# Patient Record
Sex: FEMALE | Race: WHITE | URBAN - NONMETROPOLITAN AREA
[De-identification: names, ages, dates, MRNs, and addresses within clinical notes are randomized per-mention and may not be internally consistent; named-entity substitution may affect disease eponyms.]

---

## 2017-12-13 ENCOUNTER — APPOINTMENT (RX ONLY)
Dept: URBAN - NONMETROPOLITAN AREA CLINIC 1 | Facility: CLINIC | Age: 42
Setting detail: DERMATOLOGY
End: 2017-12-13

## 2017-12-13 DIAGNOSIS — D485 NEOPLASM OF UNCERTAIN BEHAVIOR OF SKIN: ICD-10-CM

## 2017-12-13 PROBLEM — L70.0 ACNE VULGARIS: Status: ACTIVE | Noted: 2017-12-13

## 2017-12-13 PROBLEM — L55.1 SUNBURN OF SECOND DEGREE: Status: ACTIVE | Noted: 2017-12-13

## 2017-12-13 PROBLEM — D48.5 NEOPLASM OF UNCERTAIN BEHAVIOR OF SKIN: Status: ACTIVE | Noted: 2017-12-13

## 2017-12-13 PROCEDURE — ? OBSERVATION

## 2017-12-13 PROCEDURE — 99201: CPT

## 2017-12-13 ASSESSMENT — LOCATION DETAILED DESCRIPTION DERM: LOCATION DETAILED: RIGHT CENTRAL MALAR CHEEK

## 2017-12-13 ASSESSMENT — LOCATION ZONE DERM: LOCATION ZONE: FACE

## 2017-12-13 ASSESSMENT — LOCATION SIMPLE DESCRIPTION DERM: LOCATION SIMPLE: RIGHT CHEEK

## 2017-12-13 NOTE — PROCEDURE: OBSERVATION
X Size Of Lesion In Cm (Optional): 0.2
Body Location Override (Optional - Billing Will Still Be Based On Selected Body Map Location If Applicable): R Cheek
Size Of Lesion In Cm (Optional): 0.4
Detail Level: Simple

## 2018-01-24 ENCOUNTER — APPOINTMENT (RX ONLY)
Dept: URBAN - NONMETROPOLITAN AREA CLINIC 1 | Facility: CLINIC | Age: 43
Setting detail: DERMATOLOGY
End: 2018-01-24

## 2018-01-24 DIAGNOSIS — L72.0 EPIDERMAL CYST: ICD-10-CM

## 2018-01-24 DIAGNOSIS — L81.4 OTHER MELANIN HYPERPIGMENTATION: ICD-10-CM

## 2018-01-24 DIAGNOSIS — D18.0 HEMANGIOMA: ICD-10-CM

## 2018-01-24 PROBLEM — D18.01 HEMANGIOMA OF SKIN AND SUBCUTANEOUS TISSUE: Status: ACTIVE | Noted: 2018-01-24

## 2018-01-24 PROCEDURE — ? PRESCRIPTION

## 2018-01-24 PROCEDURE — 99213 OFFICE O/P EST LOW 20 MIN: CPT

## 2018-01-24 PROCEDURE — ? COUNSELING

## 2018-01-24 RX ORDER — TRETINOIN 0.25 MG/G
CREAM TOPICAL
Qty: 1 | Refills: 3 | Status: ERX | COMMUNITY
Start: 2018-01-24

## 2018-01-24 RX ADMIN — TRETINOIN 1: 0.25 CREAM TOPICAL at 00:00

## 2018-01-24 ASSESSMENT — LOCATION SIMPLE DESCRIPTION DERM
LOCATION SIMPLE: ABDOMEN
LOCATION SIMPLE: RIGHT CHEEK
LOCATION SIMPLE: LEFT FOREARM
LOCATION SIMPLE: RIGHT FOREARM
LOCATION SIMPLE: LEFT CHEEK

## 2018-01-24 ASSESSMENT — LOCATION DETAILED DESCRIPTION DERM
LOCATION DETAILED: RIGHT LATERAL MALAR CHEEK
LOCATION DETAILED: PERIUMBILICAL SKIN
LOCATION DETAILED: RIGHT PROXIMAL DORSAL FOREARM
LOCATION DETAILED: LEFT INFERIOR CENTRAL MALAR CHEEK
LOCATION DETAILED: RIGHT SUPERIOR CENTRAL MALAR CHEEK
LOCATION DETAILED: LEFT DISTAL DORSAL FOREARM

## 2018-01-24 ASSESSMENT — LOCATION ZONE DERM
LOCATION ZONE: FACE
LOCATION ZONE: TRUNK
LOCATION ZONE: ARM

## 2019-01-18 ENCOUNTER — APPOINTMENT (RX ONLY)
Dept: URBAN - NONMETROPOLITAN AREA CLINIC 1 | Facility: CLINIC | Age: 44
Setting detail: DERMATOLOGY
End: 2019-01-18

## 2019-01-18 DIAGNOSIS — Z41.9 ENCOUNTER FOR PROCEDURE FOR PURPOSES OTHER THAN REMEDYING HEALTH STATE, UNSPECIFIED: ICD-10-CM

## 2019-01-18 DIAGNOSIS — L91.8 OTHER HYPERTROPHIC DISORDERS OF THE SKIN: ICD-10-CM

## 2019-01-18 DIAGNOSIS — D18.0 HEMANGIOMA: ICD-10-CM

## 2019-01-18 DIAGNOSIS — L72.0 EPIDERMAL CYST: ICD-10-CM

## 2019-01-18 PROBLEM — D18.01 HEMANGIOMA OF SKIN AND SUBCUTANEOUS TISSUE: Status: ACTIVE | Noted: 2019-01-18

## 2019-01-18 PROCEDURE — ? BOTOX

## 2019-01-18 PROCEDURE — ? SKIN TAG REMOVAL (COSMETIC)

## 2019-01-18 PROCEDURE — ? COSMETIC CONSULTATION: LASER RESURFACING

## 2019-01-18 PROCEDURE — ? ADDITIONAL NOTES

## 2019-01-18 PROCEDURE — ? COUNSELING

## 2019-01-18 PROCEDURE — ? BENIGN DESTRUCTION COSMETIC

## 2019-01-18 PROCEDURE — 99213 OFFICE O/P EST LOW 20 MIN: CPT

## 2019-01-18 PROCEDURE — ? PRESCRIPTION

## 2019-01-18 PROCEDURE — ? COSMETIC CONSULTATION: BOTULINUM TOXIN

## 2019-01-18 PROCEDURE — ? COSMETIC CONSULTATION: FILLERS

## 2019-01-18 RX ORDER — VALACYCLOVIR 1 G/1
TABLET ORAL
Qty: 30 | Refills: 3 | Status: ERX | COMMUNITY
Start: 2019-01-18

## 2019-01-18 RX ADMIN — VALACYCLOVIR 1: 1 TABLET ORAL at 00:00

## 2019-01-18 ASSESSMENT — LOCATION DETAILED DESCRIPTION DERM
LOCATION DETAILED: INFERIOR THORACIC SPINE
LOCATION DETAILED: LEFT SUPERIOR VERMILION BORDER
LOCATION DETAILED: RIGHT SUPERIOR VERMILION LIP
LOCATION DETAILED: RIGHT CENTRAL MALAR CHEEK
LOCATION DETAILED: EPIGASTRIC SKIN
LOCATION DETAILED: RIGHT PERIAREOLAR BREAST 6-7:00 REGION
LOCATION DETAILED: RIGHT SUPERIOR CENTRAL MALAR CHEEK

## 2019-01-18 ASSESSMENT — LOCATION SIMPLE DESCRIPTION DERM
LOCATION SIMPLE: LEFT UPPER LIP
LOCATION SIMPLE: RIGHT BREAST
LOCATION SIMPLE: UPPER BACK
LOCATION SIMPLE: RIGHT LIP
LOCATION SIMPLE: ABDOMEN
LOCATION SIMPLE: RIGHT CHEEK

## 2019-01-18 ASSESSMENT — LOCATION ZONE DERM
LOCATION ZONE: FACE
LOCATION ZONE: LIP
LOCATION ZONE: TRUNK

## 2019-01-18 NOTE — PROCEDURE: ADDITIONAL NOTES
Detail Level: Simple
Additional Notes: I counseled the patient regarding the following:\\n\\nRN discussed the risks and benefits of botox including but not limited to bruising, muscle weakness, lid or brow ptosis, double vision, facial weakness, headaches, procedural pain, temporary benefit only.\\n\\nIt is impossible to know the exact location of the vasculature in the treatment area. If a vein or artery is compromised by the injection, pt may experience an increase in bruising as a result. This is temporary and usually resolves in a few days to a few weeks. \\n\\nPt understands botox is a neuro-modulator that is injected into the muscle, which relaxes the muscle movement. \\n\\nPatient understands that treatment with botox only lessens dynamic wrinkles on a temporary basis, usually for 2-3 months. \\n\\nPt is aware of the variability in patient response, including a lack of response to treatment, and that no guarantee of length of benefit can be made. \\n\\nPt taking blood thinners prior to treatment may experience an increased risk for bruising and bleeding at the injection site. \\n\\nPatient should not lie down for 4 hours after injections nor exercise for 24 hours. \\n\\nIf bruising presents, pt may utilize oral or topical arnica and ice for treatment. \\n\\nPt instructed to contact the clinic with any questions, concerns, or post treatment complications; ie: pt does not feel the product worked for them, etc.\\n\\nPatient understands that the treatment is cosmetic in nature and not covered by insurance.\\n\\Herbert pt concerns and questions addressed and pt verbalized understanding.

## 2019-01-18 NOTE — PROCEDURE: SKIN TAG REMOVAL (COSMETIC)
Consent: Written consent obtained and the risks of skin tag removal was reviewed with the patient including but not limited to bleeding, pigmentary change, infection, pain, and remote possibility of scarring.
Anesthesia Type: 1% lidocaine with 1:100,000 epinephrine and a 1:10 solution of 8.4% sodium bicarbonate
Removed With: scissors
Price (Use Numbers Only, No Special Characters Or $): 25
Detail Level: Detailed
Anesthesia Volume In Cc: 1

## 2019-01-18 NOTE — PROCEDURE: BOTOX
Additional Area 4 Units: 0
Expiration Date (Month Year): 06/20
Consent: Written consent reviewed by RN and signed by pt. Risks include but not limited to lid/brow ptosis, bruising, swelling, diplopia, temporary effect, incomplete chemical denervation.  \\nPt denies pregnancy. \\nIf pt is breastfeeding, they are counseled to dispose of breast milk for 24 hours post botox injection. \\nPt denies current administration of anti-biotics. \\nPt denies allergy to albumin or lactose. \\nPt's taking blood thinners are counseled on the increased risk of bleeding and bruising at the injection site.
Price (Use Numbers Only, No Special Characters Or $): 018
Levator Labii Superioris Units: 8
Forehead Units: 10
Detail Level: Detailed
Reconstitution Date (Optional): 1/18/19
Dilution (U/0.1 Cc): 4
Lot #: [id number]
Post-Care Instructions: Patient instructed to not lie down for 4 hours and limit physical activity for 24 hours. RN recommends topical arnica and/or ice if bruising presents.\\n\\nPt instructed to contact the clinic with any questions, concerns, or post treatment complications, ie: the pt feels the product did not work for them, etc.  \\n\\Herbert pt concerns and questions addressed and pt verbalized understanding.

## 2019-01-18 NOTE — PROCEDURE: BENIGN DESTRUCTION COSMETIC
Post-Care Instructions: Patient instructed to wash treated area(s) with mild soap and water.  Patient is to wear sun protection, and avoid picking at the treated lesion.
Consent: Discussed with patient risks of crusting, scabbing, blistering, scarring, darker or lighter pigmentary change, recurrence, incomplete removal and infection.
Detail Level: Simple
Anesthesia Volume In Cc: 0.5
Price (Use Numbers Only, No Special Characters Or $): 0

## 2019-01-24 ENCOUNTER — APPOINTMENT (RX ONLY)
Dept: URBAN - NONMETROPOLITAN AREA CLINIC 1 | Facility: CLINIC | Age: 44
Setting detail: DERMATOLOGY
End: 2019-01-24

## 2019-01-24 DIAGNOSIS — Z41.9 ENCOUNTER FOR PROCEDURE FOR PURPOSES OTHER THAN REMEDYING HEALTH STATE, UNSPECIFIED: ICD-10-CM

## 2019-01-24 PROCEDURE — ? ADDITIONAL NOTES

## 2019-01-24 PROCEDURE — ? BOTOX

## 2019-01-24 PROCEDURE — ? CLEAR AND BRILLIANT LASER

## 2019-01-24 ASSESSMENT — LOCATION ZONE DERM: LOCATION ZONE: FACE

## 2019-01-24 ASSESSMENT — LOCATION SIMPLE DESCRIPTION DERM: LOCATION SIMPLE: INFERIOR FOREHEAD

## 2019-01-24 ASSESSMENT — LOCATION DETAILED DESCRIPTION DERM: LOCATION DETAILED: INFERIOR MID FOREHEAD

## 2019-01-24 NOTE — PROCEDURE: ADDITIONAL NOTES
Additional Notes: [facility] and [facility] Laser Patient Post Care Instructions\\n\\nWhat to Expect After Treatment \\nLaser Treatments produce side effects. The intensity and duration of your side effects depends on the treatment aggressiveness and your individual healing characteristics. Generally, patients who are treated more aggressively experience more intense and longer lasting side effects; however, some patients who receive a less aggressive treatment may experience side effects of greater-than-expected magnitude, while others receiving more aggressive treatments may experience side effects of less-than-expected magnitude. Notify your provider if the severity of your side effects becomes a problem for you. \\n\\nWhat you may feel and how you will look: \\n\\n•	Immediately after the treatment, you will experience redness; similar to a sunburn.  Most redness resolves during the first day after treatment, but a jordan “glow” can remain for several days. If you wish, you can apply makeup to minimize the redness 3 hours post treatment. \\n\\n•	Swelling is possible post treatment but usually resolves by the next day.  You may use ice to calm the swelling if you wish.   \\n\\n•	Heat sensation can be intense for the following 2-3 hours.  You may use ice to calm the sensation. \\n \\n•	Over the next few days, the redness will subside.  Your skin may feel dry, peel, or flake. You may notice a “sandpaper” texture a few days after treatment. This is the treated tissue working its way out of your body as new fresh skin is regenerated. \\n\\n•	Most patients have an improved appearance 3 to 4 days post treatment.  Treatments can be performed monthly. \\n\\n•	If you experience cold sores we will provide you with an anti-viral medication prior to treatment.  You may pre treat for 3 days before the treatment if you regularly experience cold sore breakouts.  If you do not regularly experience cold sores, we will still provide you with the prescription for the anti-viral medication, which you could take if you started to experience symptoms (tingling, itching, small sores around mouth) per the prescribed instructions. \\n\\n\\n•	Sun protection is imperative following the treatment.  It is recommended that direct sun is avoided for 48 to 96 hours post treatment.  If sun exposure is unavoidable, the patient must be diligent about reapplication of a broad spectrum sunscreen every 90 minutes, with a hat or clothing used to protect the treatment area. \\n\\n\\n \\nHow to Care for Your Skin After Treatment\\n\\nCongratulations! You have taken the first step toward more healthy and radiant looking skin by having a Clear and Tur[facility] laser treatment. Now it is important to help your skin heal quickly and protect your skin investment. \\nYour after treatment skin care regimen is tailored to the treatment you received today. General guidelines are listed below and may be customized to you:  \\nImmediately After Treatment \\n•	You may cleanse your face with a mild cleanser. \\n•	Cold Sores - If you have a history of cold sores, advise your provider so a prescription can be sent in to your pharmacy. \\n•	Keep area moisturized continuously with a gentle moisturizer, preferably nothing occlusive, ie: Aquaphor. \\n•	Use of icepacks helps alleviate the heat sensation and swelling if it presents.  \\n•	Abnormal Healing - If you notice any blisters, cuts, bruises, crusting/scabs, areas of raw skin, ulcerations, active bleeding, increased discomfort or pain, pigment changes (lighter or darker than usual complexion), or any other problems, please contact us as soon as possible. Do not pick at the blisters or ulcerations, apply a topical antibiotic cream and contact the office.  These usually resolve without permanent damage. \\nFirst One to Two Days of the Healing Process \\n•	Continue cleansing and moisturizing with the recommended products to prevent clogged pores and breakouts. \\n\\n•	During the first day of the healing process avoid smoking, excessive alcohol consumption, excessive exercise, perspiring, swimming, or exposing skin to heat and sun. \\n\\n•	Remember that peeling and/or flaking is normal during the healing process but not experienced by all patients. The most common reaction is a sandpaper feeling for 2 to 4 days post treatment, however this may not be visible to others. \\n\\n•	Avoid scrubs, toners, glycolic acids, and Retin A until the skin has healed.  \\n\\n•	It is very important that you use sunscreen to prevent sun damage to the skin. Sunscreen should offer broadband protection (UVA and UVB) and have a sun protection factor (SPF) of 30 or more. Use sunscreen daily after your last treatment. Apply sunscreen 20 minutes before going outside, and again, immediately before. Reapply sunscreen every 90 minutes. If direct sun exposure is necessary, wear a hat and clothing that covers the treated area. Your practice of diligent sunscreen use may lower the risk of laser-induced hyperpigmentation (darker color).\\n\\n•	Once the skin is healed you may resume your normal skin care regimen. If you have questions about skin care products please speak with your provider. \\n\\n•	If you have questions or concerns, please contact our office at [phone].  If it is after hours or on the weekend the practitioner may provide you with a way to contact them.  \\n\\n•	Other instructions:\\n
Detail Level: Simple

## 2019-01-24 NOTE — PROCEDURE: CLEAR AND BRILLIANT LASER
Detail Level: Zone
Post-Care Instructions: RN reviewed with the patient in detail post-care instructions.  Pt aware of post treatment pain, swelling, redness, and rough texture, which can last days.  Strict sun avoidance and protection emphasized.  If sun exposure is unavoidable, pt instructed to cover tx area with clothing. \\n\\Herbert pt concerns and questions addressed, pt verbalized understanding.\\n\\n
Post-Procedure Care: 4 passes with C&B handpiece, medium level, additional pass over areas of concern. Moisturizer and sun protection applied to treatment area.
Pre-Procedure Text: Pre treatment photos taken and topical numbing removed with a warm towel prior to procedure.\\n\\nPt states they have been taking valacyclovir, as instructed, 3 days prior to and post treatment for suppression of cold sores.
Consent: Written consent reviewed by RN and signed by pt.  Risks reviewed including but not limited to redness, heat sensation, swelling, pigmentary changes, scabbing, crusting, blistering, and reactivation of cold sores. \\n\\nPt aware that several treatments may be needed to achieve desired results. \\n\\nNo guarantees can be made and results vary from pt to pt. \\n\\n
Laser Type: Clear+Brilliant
Price (Use Numbers Only, No Special Characters Or $): 509
Energy Level: Medium

## 2019-02-07 ENCOUNTER — APPOINTMENT (RX ONLY)
Dept: URBAN - NONMETROPOLITAN AREA CLINIC 1 | Facility: CLINIC | Age: 44
Setting detail: DERMATOLOGY
End: 2019-02-07

## 2019-02-07 DIAGNOSIS — Z41.9 ENCOUNTER FOR PROCEDURE FOR PURPOSES OTHER THAN REMEDYING HEALTH STATE, UNSPECIFIED: ICD-10-CM

## 2019-02-07 PROCEDURE — ? COSMETIC FOLLOW-UP

## 2019-02-07 NOTE — PROCEDURE: COSMETIC FOLLOW-UP
Detail Level: Zone
Side Effects Or Complications: Swelling
Treatment (Optional): Laser Resurfacing
Price (Use Numbers Only, No Special Characters Or $): 0
Patient Satisfaction: Very pleased
Global Improvement: Very Good
Comments (Free Text): Compared pt photos pre and post C&B and Botox of FH, GLB, & Odalis-oral.  Pt is very please with let result, noticed an improvement in fine lines and pigmentation.  Pt very pleased with FH and GLB Botox but states she still sees lines around her mouth and would not have Botox injected in that area again.  RN reassured pt the Botox can prevent worsening of the lines and recommended microneedling for spot tx of perioral area.  \\n\\nPt provided a sample of Elta Eye Gel to diminish under eye puffiness and dark circles.  \\n\\nPt’s father currently in the hospital in [place] with EtOH related cardiac issues.

## 2019-02-21 ENCOUNTER — APPOINTMENT (RX ONLY)
Dept: URBAN - NONMETROPOLITAN AREA CLINIC 1 | Facility: CLINIC | Age: 44
Setting detail: DERMATOLOGY
End: 2019-02-21

## 2019-02-21 DIAGNOSIS — Z41.9 ENCOUNTER FOR PROCEDURE FOR PURPOSES OTHER THAN REMEDYING HEALTH STATE, UNSPECIFIED: ICD-10-CM

## 2019-02-21 PROCEDURE — ? ADDITIONAL NOTES

## 2019-02-21 PROCEDURE — ? CLEAR AND BRILLIANT LASER

## 2019-02-21 ASSESSMENT — LOCATION ZONE DERM: LOCATION ZONE: FACE

## 2019-02-21 ASSESSMENT — LOCATION SIMPLE DESCRIPTION DERM: LOCATION SIMPLE: RIGHT CHEEK

## 2019-02-21 ASSESSMENT — LOCATION DETAILED DESCRIPTION DERM: LOCATION DETAILED: RIGHT SUPERIOR CENTRAL MALAR CHEEK

## 2019-02-21 NOTE — PROCEDURE: CLEAR AND BRILLIANT LASER
Price (Use Numbers Only, No Special Characters Or $): 612
Consent: Written consent reviewed by RN and signed by pt.  Risks reviewed including but not limited to redness, heat sensation, swelling, pigmentary changes, scabbing, crusting, blistering, and reactivation of cold sores. \\n\\nPt aware that several treatments may be needed to achieve desired results. \\n\\nNo guarantees can be made and results vary from pt to pt.
Laser Type: Clear and Brilliant Permea 1927nm
Pre-Procedure Text: Pre treatment photos taken and topical numbing removed with a warm towel prior to procedure.\\n\\nPt states she has not taken been taking valacyclovir, as instructed, 3 days prior to and post treatment for suppression of cold sores.  Pt instructed to follow Rx’d instructions for outbreak if s/six of a cold sore presents.
Post-Procedure Care: 4 passes with premea handpiece, medium level, additional pass over areas of concern. Moisturizer and sun protection applied to treatment area.
Detail Level: Zone
Post-Care Instructions: RN reviewed with the patient in detail post-care instructions.  Pt aware of post treatment pain, swelling, redness, and rough texture, which can last days.  Strict sun avoidance and protection emphasized.  If sun exposure is unavoidable, pt instructed to cover tx area with clothing. \\n\\Herbert pt concerns and questions addressed, pt verbalized understanding.
Energy Level: High

## 2019-02-21 NOTE — PROCEDURE: ADDITIONAL NOTES
Additional Notes: [facility] Patient Post Care Instructions\\n\\nWhat to Expect After Treatment \\nLaser Treatments produce side effects. The intensity and duration of your side effects depends on the treatment aggressiveness and your individual healing characteristics. Generally, patients who are treated more aggressively experience more intense and longer lasting side effects; however, some patients who receive a less aggressive treatment may experience side effects of greater-than-expected magnitude, while others receiving more aggressive treatments may experience side effects of less-than-expected magnitude. Notify your provider if the severity of your side effects becomes a problem for you. \\n\\nWhat you may feel and how you will look: \\n\\n•	Immediately after the treatment, you will experience redness; similar to a sunburn.  Most redness resolves during the first day after treatment, but a jordan “glow” can remain for several days. If you wish, you can apply makeup to minimize the redness 3 hours post treatment. \\n\\n•	Swelling is possible post treatment but usually resolves by the next day.  You may use ice to calm the swelling if you wish.   \\n\\n•	Heat sensation can be intense for the following 2-3 hours.  You may use ice to calm the sensation. \\n \\n•	Over the next few days, the redness will subside.  Your skin may feel dry, peel, or flake. You may notice a “sandpaper” texture a few days after treatment. This is the treated tissue working its way out of your body as new fresh skin is regenerated. \\n\\n•	Most patients have an improved appearance 3 to 4 days post treatment.  Treatments can be performed monthly. \\n\\n•	If you experience cold sores we will provide you with an anti-viral medication prior to treatment.  You may pre treat for 3 days before the treatment if you regularly experience cold sore breakouts.  If you do not regularly experience cold sores, we will still provide you with the prescription for the anti-viral medication, which you could take if you started to experience symptoms (tingling, itching, small sores around mouth) per the prescribed instructions. \\n\\n\\n•	Sun protection is imperative following the treatment.  It is recommended that direct sun is avoided for 48 to 96 hours post treatment.  If sun exposure is unavoidable, the patient must be diligent about reapplication of a broad spectrum sunscreen every 90 minutes, with a hat or clothing used to protect the treatment area. \\n\\n\\n \\nHow to Care for Your Skin After Treatment\\n\\nCongratulations! You have taken the first step toward more healthy and radiant looking skin by having a Clear and [facility] laser treatment. Now it is important to help your skin heal quickly and protect your skin investment. \\nYour after treatment skin care regimen is tailored to the treatment you received today. General guidelines are listed below and may be customized to you:  \\nImmediately After Treatment \\n•	You may cleanse your face with a mild cleanser. \\n•	Cold Sores - If you have a history of cold sores, advise your provider so a prescription can be sent in to your pharmacy. \\n•	Keep area moisturized continuously with a gentle moisturizer, preferably nothing occlusive, ie: Aquaphor. \\n•	Use of icepacks helps alleviate the heat sensation and swelling if it presents.  \\n•	Abnormal Healing - If you notice any blisters, cuts, bruises, crusting/scabs, areas of raw skin, ulcerations, active bleeding, increased discomfort or pain, pigment changes (lighter or darker than usual complexion), or any other problems, please contact us as soon as possible. Do not pick at the blisters or ulcerations, apply a topical antibiotic cream and contact the office.  These usually resolve without permanent damage. \\nFirst One to Two Days of the Healing Process \\n•	Continue cleansing and moisturizing with the recommended products to prevent clogged pores and breakouts. \\n\\n•	During the first day of the healing process avoid smoking, excessive alcohol consumption, excessive exercise, perspiring, swimming, or exposing skin to heat and sun. \\n\\n•	Remember that peeling and/or flaking is normal during the healing process but not experienced by all patients. The most common reaction is a sandpaper feeling for 2 to 4 days post treatment, however this may not be visible to others. \\n\\n•	Avoid scrubs, toners, glycolic acids, and Retin A until the skin has healed.  \\n\\n•	It is very important that you use sunscreen to prevent sun damage to the skin. Sunscreen should offer broadband protection (UVA and UVB) and have a sun protection factor (SPF) of 30 or more. Use sunscreen daily after your last treatment. Apply sunscreen 20 minutes before going outside, and again, immediately before. Reapply sunscreen every 90 minutes. If direct sun exposure is necessary, wear a hat and clothing that covers the treated area. Your practice of diligent sunscreen use may lower the risk of laser-induced hyperpigmentation (darker color).\\n\\n•	Once the skin is healed you may resume your normal skin care regimen. If you have questions about skin care products please speak with your provider. \\n\\n•	If you have questions or concerns, please contact our office at [phone].  If it is after hours or on the weekend the practitioner may provide you with a way to contact them.  \\n\\n•	Other instructions:\\n
Detail Level: Simple

## 2019-03-07 ENCOUNTER — APPOINTMENT (RX ONLY)
Dept: URBAN - NONMETROPOLITAN AREA CLINIC 1 | Facility: CLINIC | Age: 44
Setting detail: DERMATOLOGY
End: 2019-03-07

## 2019-03-07 DIAGNOSIS — Z41.9 ENCOUNTER FOR PROCEDURE FOR PURPOSES OTHER THAN REMEDYING HEALTH STATE, UNSPECIFIED: ICD-10-CM

## 2019-03-07 PROCEDURE — ? COSMETIC FOLLOW-UP

## 2019-03-07 NOTE — PROCEDURE: COSMETIC FOLLOW-UP
Improvement Override (Free Text): compared pt before and after photos and saw definite lightening of the pigmented areas.
Comments (Free Text): Pt states she exfoliated with a mechanical scrub 4 days post tx to remove increased pigmentation. Pt educated to follow post care instructions and do not exfoliate until fully healed from the procedure. Pt verbalized understanding.
Price (Use Numbers Only, No Special Characters Or $): 0
Global Improvement: Modest
Patient Satisfaction: Pleased
Treatment (Optional): Laser
Side Effects Override (Free Text): pigment destruction with visible increased pigmentation while healing and diminished pigment 2 weeks post tx.
Detail Level: Zone

## 2019-03-28 ENCOUNTER — APPOINTMENT (RX ONLY)
Dept: URBAN - NONMETROPOLITAN AREA CLINIC 1 | Facility: CLINIC | Age: 44
Setting detail: DERMATOLOGY
End: 2019-03-28

## 2019-03-28 DIAGNOSIS — Z41.9 ENCOUNTER FOR PROCEDURE FOR PURPOSES OTHER THAN REMEDYING HEALTH STATE, UNSPECIFIED: ICD-10-CM

## 2019-03-28 PROCEDURE — ? ADDITIONAL NOTES

## 2019-03-28 PROCEDURE — ? COSMETIC FOLLOW-UP

## 2019-03-28 PROCEDURE — ? CLEAR AND BRILLIANT LASER

## 2019-03-28 ASSESSMENT — LOCATION SIMPLE DESCRIPTION DERM: LOCATION SIMPLE: INFERIOR FOREHEAD

## 2019-03-28 ASSESSMENT — LOCATION ZONE DERM: LOCATION ZONE: FACE

## 2019-03-28 ASSESSMENT — LOCATION DETAILED DESCRIPTION DERM: LOCATION DETAILED: INFERIOR MID FOREHEAD

## 2019-03-28 NOTE — PROCEDURE: CLEAR AND BRILLIANT LASER
Detail Level: Zone
Energy Level: High
Consent: Written consent reviewed by RN and signed by pt.  Risks reviewed including but not limited to redness, heat sensation, swelling, pigmentary changes, scabbing, crusting, blistering, and reactivation of cold sores. \\n\\nPt aware that several treatments may be needed to achieve desired results. \\n\\nNo guarantees can be made and results vary from pt to pt.
Laser Type: Clear and Brilliant Permea 1927nm
Topical Anesthesia?: 23% lidocaine, 7% tetracaine
Post-Care Instructions: RN reviewed with the patient in detail post-care instructions.  Pt aware of post treatment pain, swelling, redness, and rough texture, which can last days.  Strict sun avoidance and protection emphasized.  If sun exposure is unavoidable, pt instructed to cover tx area with clothing. \\n\\Herbert pt concerns and questions addressed, pt verbalized understanding.
Post-Procedure Care: 8 passes with C&B handpiece, high level, additional pass over areas of concern, and 2 passes, low level under the eyes. Moisturizer and sun protection applied to treatment area.
Length Of Topical Anesthesia Application (Optional): 15 minutes
Price (Use Numbers Only, No Special Characters Or $): 690
Pre-Procedure Text: Pre treatment photos taken and topical numbing removed with a warm towel prior to procedure.\\n\\nPt states she will begin taking oral anti-viral pst tax today and for the next 3 days.

## 2019-03-28 NOTE — PROCEDURE: COSMETIC FOLLOW-UP
Treatment (Optional): Laser Resurfacing
Detail Level: Zone
Side Effects Or Complications: Over correction
Comments (Free Text): RN compared before and after photos with pt.  Pt states she can see a very good improvement in her melasma and she is very pleased with her result.
Treatment Override (Free Text): C&B Premea
Global Improvement: Very Good
Price (Use Numbers Only, No Special Characters Or $): 0
Patient Satisfaction: Very pleased

## 2019-04-25 ENCOUNTER — APPOINTMENT (RX ONLY)
Dept: URBAN - NONMETROPOLITAN AREA CLINIC 1 | Facility: CLINIC | Age: 44
Setting detail: DERMATOLOGY
End: 2019-04-25

## 2019-04-25 DIAGNOSIS — Z41.9 ENCOUNTER FOR PROCEDURE FOR PURPOSES OTHER THAN REMEDYING HEALTH STATE, UNSPECIFIED: ICD-10-CM

## 2019-04-25 PROCEDURE — ? BOTOX

## 2019-04-25 PROCEDURE — ? COSMETIC FOLLOW-UP

## 2019-04-25 NOTE — PROCEDURE: BOTOX
Additional Area 4 Units: 0
Consent: Written consent reviewed by RN and signed by pt. Risks include but not limited to lid/brow ptosis, bruising, swelling, diplopia, temporary effect, incomplete chemical denervation.\\nPt denies pregnancy.   \\nIf pt is breastfeeding, they are counseled to dispose of breast milk for 24 hours post botox injection. \\nPt denies current administration of anti-biotics. \\nPt denies allergy to albumin or lactose. \\nPt's taking blood thinners are counseled on the increased risk of bleeding and bruising at the injection site.
Detail Level: Detailed
Forehead Units: 10
Lot #: [id number]
Expiration Date (Month Year): 6/30/2020
Post-Care Instructions: Pt has a hx of cold sores, instructed to take an antiviral prior to and post tx for suppression of an outbreak. \\n\\nPatient instructed to not lie down for 4 hours and limit physical activity for 24 hours. RN recommends topical arnica and/or ice if bruising presents.\\n\\nPt instructed to contact the clinic with any questions, concerns, or post treatment complications, ie: the pt feels the product did not work for them, etc.  \\n\\Herbert pt concerns and questions addressed and pt verbalized understanding.
Dilution (U/0.1 Cc): 4
Price (Use Numbers Only, No Special Characters Or $): 240
Reconstitution Date (Optional): 4/25/19

## 2019-04-25 NOTE — PROCEDURE: COSMETIC FOLLOW-UP
Patient Satisfaction: Pleased
Price (Use Numbers Only, No Special Characters Or $): 0
Treatment Override (Free Text): C&B
Side Effects Override (Free Text): pt states she had darkening of melisma pigment then clearing that took as much as 2 weeks. pt states she is pleased and will be booking again soon
Global Improvement: Modest
Detail Level: Zone

## 2019-05-10 ENCOUNTER — APPOINTMENT (RX ONLY)
Dept: URBAN - NONMETROPOLITAN AREA CLINIC 1 | Facility: CLINIC | Age: 44
Setting detail: DERMATOLOGY
End: 2019-05-10

## 2019-09-27 ENCOUNTER — APPOINTMENT (RX ONLY)
Dept: URBAN - NONMETROPOLITAN AREA CLINIC 1 | Facility: CLINIC | Age: 44
Setting detail: DERMATOLOGY
End: 2019-09-27

## 2019-09-27 DIAGNOSIS — Z41.9 ENCOUNTER FOR PROCEDURE FOR PURPOSES OTHER THAN REMEDYING HEALTH STATE, UNSPECIFIED: ICD-10-CM

## 2019-09-27 PROCEDURE — ? BOTOX

## 2019-09-27 PROCEDURE — ? ADDITIONAL NOTES

## 2019-09-27 PROCEDURE — ? FILLERS

## 2019-09-27 NOTE — PROCEDURE: FILLERS
Dorsal Hands Filler Volume In Cc: 0
Consent: Written consent reviewed by RN and signed by pt. \\nRisks include but not limited to bruising, bleeding, irregular texture, ulceration, infection, allergic reaction, scar formation, incomplete augmentation, temporary nature, procedural pain, and vessel occlusion.  \\n\\Herbert pt concerns and questions addressed, pt verbalized understanding.
Include Cannula Information In Note?: No
Detail Level: Detailed
Map Statment: See Attach Map for Complete Details
Expiration Date (Month Year): 10/31/19
Topical Anesthesia?: 23% lidocaine, 7% tetracaine
Post-Care Instructions: Patient instructed to apply ice to reduce swelling. RN also recommends Arnica gel to reduce bruising. Pt instructed to contact the provider if complications arise.
Filler: Juvederm Ultra
Lot #: [id number]
Price (Use Numbers Only, No Special Characters Or $): 630
Vermilion Lips Filler Volume In Cc: 1

## 2019-09-27 NOTE — PROCEDURE: BOTOX
Additional Area 2 Units: 0
Lot #: [id number]
Detail Level: Detailed
Price (Use Numbers Only, No Special Characters Or $): 312
Glabellar Complex Units: 10
Forehead Units: 16
Dilution (U/0.1 Cc): 4
Post-Care Instructions: Patient instructed to not lie down for 4 hours and limit physical activity for 24 hours. RN recommends topical arnica and/or ice if bruising presents.\\n\\nPt instructed to contact the clinic with any questions, concerns, or post treatment complications, ie: the pt feels the product did not work for them, etc.  \\n\\Herbert pt concerns and questions addressed and pt verbalized understanding.
Consent: Written consent reviewed by RN and signed by pt. Risks include but not limited to lid/brow ptosis, bruising, swelling, diplopia, temporary effect, incomplete chemical denervation.  \\nPt denies pregnancy. \\nIf pt is breastfeeding, they are counseled to dispose of breast milk for 24 hours post botox injection. \\nPt denies current administration of anti-biotics. \\nPt denies allergy to albumin or lactose. \\nPt's taking blood thinners are counseled on the increased risk of bleeding and bruising at the injection site.

## 2019-09-27 NOTE — PROCEDURE: ADDITIONAL NOTES
Additional Notes: I counseled the patient regarding the following:\\n\\nRN discussed the risks and benefits of botox including but not limited to bruising, muscle weakness, lid or brow ptosis, double vision, facial weakness, headaches, procedural pain, temporary benefit only.\\n\\nIt is impossible to know the exact location of the vasculature in the treatment area. If a vein or artery is compromised by the injection, pt may experience an increase in bruising as a result. This is temporary and usually resolves in a few days to a few weeks. \\n\\nPt understands botox is a neuro-modulator that is injected into the muscle, which relaxes the muscle movement. \\n\\nPatient understands that treatment with botox only lessens dynamic wrinkles on a temporary basis, usually for 2-3 months. \\n\\nPt is aware of the variability in patient response, including a lack of response to treatment, and that no guarantee of length of benefit can be made. \\n\\nPt taking blood thinners prior to treatment may experience an increased risk for bruising and bleeding at the injection site. \\n\\nPatient should not lie down for 4 hours after injections nor exercise for 24 hours. \\n\\nIf bruising presents, pt may utilize oral or topical arnica and ice for treatment. \\n\\nPt instructed to contact the clinic with any questions, concerns, or post treatment complications; ie: pt does not feel the product worked for them, etc.\\n\\nPatient understands that the treatment is cosmetic in nature and not covered by insurance.\\n\\Herbert pt concerns and questions addressed and pt verbalized understanding.
Detail Level: Simple

## 2019-10-11 ENCOUNTER — APPOINTMENT (RX ONLY)
Dept: URBAN - NONMETROPOLITAN AREA CLINIC 1 | Facility: CLINIC | Age: 44
Setting detail: DERMATOLOGY
End: 2019-10-11

## 2019-10-11 DIAGNOSIS — Z41.9 ENCOUNTER FOR PROCEDURE FOR PURPOSES OTHER THAN REMEDYING HEALTH STATE, UNSPECIFIED: ICD-10-CM

## 2019-10-11 PROCEDURE — ? ADDITIONAL NOTES

## 2019-10-11 PROCEDURE — ? COSMETIC FOLLOW-UP

## 2019-10-11 PROCEDURE — ? HYALURONIDASE INJECTION

## 2019-10-11 PROCEDURE — ? BOTOX

## 2019-10-11 ASSESSMENT — LOCATION DETAILED DESCRIPTION DERM
LOCATION DETAILED: RIGHT SUPERIOR VERMILION LIP
LOCATION DETAILED: LEFT SUPERIOR VERMILION LIP
LOCATION DETAILED: LEFT UPPER CUTANEOUS LIP
LOCATION DETAILED: RIGHT UPPER CUTANEOUS LIP

## 2019-10-11 ASSESSMENT — LOCATION SIMPLE DESCRIPTION DERM
LOCATION SIMPLE: LEFT LIP
LOCATION SIMPLE: RIGHT LIP

## 2019-10-11 ASSESSMENT — LOCATION ZONE DERM: LOCATION ZONE: LIP

## 2019-10-11 NOTE — PROCEDURE: HYALURONIDASE INJECTION
Detail Level: Detailed
Hyaluronidase Preparation: hyaluronidase
Filler Previously Used (Optional): Ultra
Total Volume (Ccs): 1
Lot # (Optional): [id number]
Administered By (Optional): VALORIE Rodríguez
Consent: Hyaluronidase mixed with 0.1 ml of 1% lidocaine. \\n\\nThe risks of contour defects and dimpling of the skin were reviewed with the patient prior to the injection.

## 2019-10-11 NOTE — PROCEDURE: COSMETIC FOLLOW-UP
Treatment (Optional): Filler Injection
Price (Use Numbers Only, No Special Characters Or $): 0
Patient Satisfaction: Dissatisfied
Global Improvement: Very Good
Detail Level: Zone
Comments (Free Text): Pt  was displeased with L upper lip where multiple lines were present. We decided with the help of Dr. De Los Santos to take the  filler out and replace it with a syringe of complimentary filler provided by [employer] for training purposes.
Side Effects Override (Free Text): bruising
no

## 2019-10-11 NOTE — PROCEDURE: BOTOX
Detail Level: Detailed
Additional Area 4 Units: 0
Forehead Units: 8
Dilution (U/0.1 Cc): 4
Consent: Written consent reviewed by RN and signed by pt. Risks include but not limited to lid/brow ptosis, bruising, swelling, diplopia, temporary effect, incomplete chemical denervation.  \\nPt denies pregnancy. \\nIf pt is breastfeeding, they are counseled to dispose of breast milk for 24 hours post botox injection. \\nPt denies current administration of anti-biotics. \\nPt denies allergy to albumin or lactose. \\nPt's taking blood thinners are counseled on the increased risk of bleeding and bruising at the injection site.
Post-Care Instructions: Patient instructed to not lie down for 4 hours and limit physical activity for 24 hours. RN recommends topical arnica and/or ice if bruising presents.\\n\\nPt instructed to contact the clinic with any questions, concerns, or post treatment complications, ie: the pt feels the product did not work for them, etc.  \\n\\Herbert pt concerns and questions addressed and pt verbalized understanding.\\n\\nComplimentary touch up with product provided by [employer].

## 2019-10-17 ENCOUNTER — APPOINTMENT (RX ONLY)
Dept: URBAN - NONMETROPOLITAN AREA CLINIC 1 | Facility: CLINIC | Age: 44
Setting detail: DERMATOLOGY
End: 2019-10-17

## 2019-10-17 DIAGNOSIS — Z41.9 ENCOUNTER FOR PROCEDURE FOR PURPOSES OTHER THAN REMEDYING HEALTH STATE, UNSPECIFIED: ICD-10-CM

## 2019-10-17 PROCEDURE — ? HYALURONIDASE INJECTION

## 2019-10-17 ASSESSMENT — LOCATION DETAILED DESCRIPTION DERM
LOCATION DETAILED: RIGHT SUPERIOR VERMILION BORDER
LOCATION DETAILED: RIGHT INFERIOR VERMILION LIP
LOCATION DETAILED: LEFT UPPER CUTANEOUS LIP
LOCATION DETAILED: LEFT INFERIOR VERMILION LIP

## 2019-10-17 ASSESSMENT — LOCATION SIMPLE DESCRIPTION DERM
LOCATION SIMPLE: RIGHT LIP
LOCATION SIMPLE: LEFT LIP
LOCATION SIMPLE: RIGHT UPPER LIP

## 2019-10-17 ASSESSMENT — LOCATION ZONE DERM: LOCATION ZONE: LIP

## 2019-10-17 NOTE — PROCEDURE: HYALURONIDASE INJECTION
Hyaluronidase Preparation: hyaluronidase with 0.1ml of 1% lidocaine
Treatment Number (Optional): 2
Total Volume (Ccs): 1
Consent: The risks of contour defects and dimpling of the skin were reviewed with the patient prior to the injection.
Detail Level: Detailed
Lot # (Optional): [id number]
Administered By (Optional): VALORIE Rodríguez
Filler Previously Used (Optional): Ultra

## 2019-10-24 ENCOUNTER — APPOINTMENT (RX ONLY)
Dept: URBAN - NONMETROPOLITAN AREA CLINIC 1 | Facility: CLINIC | Age: 44
Setting detail: DERMATOLOGY
End: 2019-10-24

## 2019-10-24 DIAGNOSIS — Z41.9 ENCOUNTER FOR PROCEDURE FOR PURPOSES OTHER THAN REMEDYING HEALTH STATE, UNSPECIFIED: ICD-10-CM

## 2019-10-24 PROCEDURE — ? COSMETIC FOLLOW-UP

## 2019-10-24 NOTE — PROCEDURE: COSMETIC FOLLOW-UP
Treatment Override (Free Text): filler
Comments (Free Text): Pt states she does not feel filler is the treatment she wants to correct fine lines above the lip. She states she would like to consult in one month to determine next treatment steps.
Detail Level: Zone
Price (Use Numbers Only, No Special Characters Or $): 0

## 2019-11-21 ENCOUNTER — APPOINTMENT (RX ONLY)
Dept: URBAN - NONMETROPOLITAN AREA CLINIC 1 | Facility: CLINIC | Age: 44
Setting detail: DERMATOLOGY
End: 2019-11-21

## 2019-11-21 DIAGNOSIS — Z41.9 ENCOUNTER FOR PROCEDURE FOR PURPOSES OTHER THAN REMEDYING HEALTH STATE, UNSPECIFIED: ICD-10-CM

## 2019-11-21 PROCEDURE — ? ADDITIONAL NOTES

## 2019-11-21 PROCEDURE — ? CLEAR AND BRILLIANT LASER

## 2019-11-21 ASSESSMENT — LOCATION DETAILED DESCRIPTION DERM: LOCATION DETAILED: INFERIOR MID FOREHEAD

## 2019-11-21 ASSESSMENT — LOCATION ZONE DERM: LOCATION ZONE: FACE

## 2019-11-21 ASSESSMENT — LOCATION SIMPLE DESCRIPTION DERM: LOCATION SIMPLE: INFERIOR FOREHEAD

## 2019-11-21 NOTE — PROCEDURE: CLEAR AND BRILLIANT LASER
Length Of Topical Anesthesia Application (Optional): 15 minutes
Post-Procedure Care: 8 passes with C&B handpiece, additional pass over areas of concern and 2 passes, low level under eyes. Moisturizer and sun protection applied to treatment area.
Detail Level: Zone
Post-Care Instructions: RN reviewed with the patient in detail post-care instructions.  Pt aware of post treatment pain, swelling, redness, and rough texture, which can last days.  Strict sun avoidance and protection emphasized.  If sun exposure is unavoidable, pt instructed to cover tx area with clothing. \\n\\Herbert pt concerns and questions addressed, pt verbalized understanding.\\n\\nComplimentary treatment cycle provided by [facility].
Pre-Procedure Text: Pre treatment photos taken and topical numbing removed with a warm towel prior to procedure.\\n\\nAppropriate eye protection placed on pt and pt instructed to keep their eyes closed during the treatment. \\n\\nPt states they have been taking valacyclovir, as instructed, 3 days prior to and post treatment for suppression of cold sores.
Consent: Written consent reviewed by RN and signed by pt.  Risks reviewed including but not limited to redness, heat sensation, swelling, pigmentary changes, scabbing, crusting, blistering, and reactivation of cold sores. \\n\\nPt aware that several treatments may be needed to achieve desired results. \\n\\nNo guarantees can be made and results vary from pt to pt.
Topical Anesthesia?: 23% lidocaine, 7% tetracaine
Energy Level: High
Laser Type: Clear+Brilliant

## 2019-11-21 NOTE — PROCEDURE: ADDITIONAL NOTES
Additional Notes: Clear and [place] Laser Patient Post Care Instructions\\n\\nThe Clear & [place] Treatment is a safe and effective laser resurfacing treatment for fine lines, wrinkles, and superficial pigment, such as melasma.  \\n\\Zuleima with any cosmetic treatment, no guarantees can be made and results vary individually from patient to patient.  This information sheet outlines expectations pre and post treatment and provides a reference for your home routine after treatment.  \\n\\Heena treatment done in the office can be improved or worsened by what you do at home both before and after.  Your provider requests you bring your home skincare products with you for an assessment; either during the consult or on the day of service.  If you do not currently use skin care, a post treatment kit with products safe to use post treatment can be purchased for a minimal cost.  \\n\\nThe most common cause of complications is failing to follow these recommendations.\\n\\nBefore your treatment\\n• Discontinue retinol, retinoid, vitamin A, glycolic acids, salicylic acids, or other exfoliating products, such as scrubs, 2 days before treatment. \\n• Avoid excessive sun exposure; sunburned skin cannot be treated. \\n• Please inform your provider if you experience cold sores.  \\n Laser treatments can stimulate cold sores but an oral, anti-viral medication can be prescribed for prevention of an outbreak.  \\n You may take the anti-viral 3 days before and 3 days post the treatment to suppress possible outbreaks. \\n• Bring your skincare products with you for assessment of safe application after treatment.  \\n\\nDuring your treatment\\n• The consent will be reviewed and any questions or concerns you have will be addressed prior to beginning the treatment. \\n• Photos for before and after comparison are taken. \\n• A topical numbing agent, usually Lidocaine and Tetracaine, is applied to the treatment area.  \\n The Clear [place] treatment feels like prickly heat while performed with a warm sensation, similar to a sunburn, for a few minutes to a few hours post treatment.  \\n Once the treatment area has become numb, the topical anesthetic will be removed. \\n• Eye protection is not necessary for this treatment.  However, the provider may provide you with eye protection or request your eyes remain closed during the treatment. \\n• The treatment should be tolerable.  If you are experiencing significant discomfort, please inform the provider and adjustments can be made to ensure your comfort. \\n\\nAfter your treatment\\n• Immediately after the treatment, you will experience redness and a heat sensation similar to a sunburn.  \\n Most redness resolves during the first day after treatment, but a jordan “glow” can remain for several days. \\n You may apply makeup as soon as 3 hours post treatment. \\n You may use ice or cool compresses to calm the sensation. \\n• Swelling is possible post treatment but usually resolves within 24 hours.  \\n You may use ice packs or take oral anti-inflammatory medications to diminish these affects. \\n• Over the next few days, you will feel a dry, sandpaper-like texture to your skin.  However, the texture is usually not visible to others or appears as dry skin.  \\n DO NOT pick at the flaking, this can cause hyperpigmentation; apply moisturizer as frequently as necessary to calm this affect and it should resolve in a few days. \\n You may wear make-up if you desire and remember to protect from the sun especially while healing. \\n• Most patients will have an improved appearance in 3 to 5 days.  Treatments can be performed monthly. \\n• Sun protection and wind protection is absolutely necessary following your treatment and especially until the skin has fully healed.  \\n It is recommended you avoid the sun completely for the first 24 to 48 hours post treatment. \\n If sun exposure is unavoidable, it is important to be diligent in reapplication of a broad-spectrum sunscreen every 90 minutes. In addition, protecting the treatment area with a wide brimmed hat or clothing is highly recommended.  \\n Excessive sun exposure while the skin is still healing can cause hyperpigmentation or other complications. \\n\\nHome Care After Your Clear & [place] Treatment\\nInstructions for Home Care Until the Skin Has Fully Healed\\n• The first 24-48 hours post treatment AVOID:\\n Direct sun exposure; it is recommended to protect the treatment area with clothing and a wide brimmed hat in addition to topical application of broad-spectrum sunscreen. \\n Excessive heat exposure such at saunas and hot tubs. \\n Excessive, intense exercise that stimulates a lot of internal heat or perspiration.    \\n Smoking and excessive alcohol intake.\\n• Cleanse with a gentle .\\n Cleansing is important to prevent breakouts. \\n If you are acne prone an anti-microbial spray may be recommended to suppress an acne flare. \\n• Moisturize with a gentle moisturizer approved by your provider. \\n• AVOID corrective products such as: \\n Retinol/Retinoids\\n Alpha Hydroxy Acids (AHAs)\\n Beta Hydroxy Acids (BHAs)\\n Salicylic Acids\\n Glycolic Acids\\n Products labeled “Anti-aging,” “Skin Renewing,” or “Exfoliating,” are not recommended while you are healing. \\n If in doubt of a product’s safety, avoid using the product and stick to the approved products discussed with your provider. \\n• While some redness, flaking, crusting, and swelling is to be expected, if you feel you are having an abnormal reaction to the treatment you may contact the our office at [phone] at any time. \\n Some patients may experience a blistering of the skin after their treatment.  This is very rare but if you do experience a blister, DO NOT pick at the top of the blister. You may apply thick moisturizer such as Aquaphor or an anti-bacterial ointment to the blister and allow it to heal.  In most cases, the skin will return to normal once healed.\\n• PROTECT the treatment area from the sun with a broad-spectrum sunscreen\\n Remember to reapply every 90 minutes when you are exposed to the sun. \\nEspecially in the first 24-48 hours post treatment it is recommended to protect the treatment area with clothing or a wide-brimmed hat in addition to topical sunscreen application.  \\n• Once the skin is fully healed you may return to your normal skin care routine. \\nIf you have other questions or concerns, you may contact the office at anytime; [phone].  [facility] hopes you enjoy your experience and result!\\nOther Instructions:
Detail Level: Simple

## 2023-05-05 NOTE — PROCEDURE: BOTOX
biliary drain placement
Post-Care Instructions: Patient instructed to not lie down for 4 hours and limit physical activity for 24 hours. RN recommends topical arnica and/or ice if bruising presents.\\n\\nPt instructed to contact the clinic with any questions, concerns, or post treatment complications, ie: the pt feels the product did not work for them, etc.  \\n\\Herbert pt concerns and questions addressed and pt verbalized understanding.
Levator Labii Superioris Units: 0
Consent: Written consent reviewed by RN and signed by pt. Risks include but not limited to lid/brow ptosis, bruising, swelling, diplopia, temporary effect, incomplete chemical denervation.\\nPt denies pregnancy.   \\nIf pt is breastfeeding, they are counseled to dispose of breast milk for 24 hours post botox injection. \\nPt denies current administration of anti-biotics. \\nPt denies allergy to albumin or lactose. \\nPt's taking blood thinners are counseled on the increased risk of bleeding and bruising at the injection site.
Dilution (U/0.1 Cc): 4
Reconstitution Date (Optional): 1/23/19
Expiration Date (Month Year): 6/30/2020
Detail Level: Detailed
Inferior Lateral Orbicularis Oculi Units: 8
Lot #: [id number]
Price (Use Numbers Only, No Special Characters Or $): 96